# Patient Record
Sex: FEMALE | Race: WHITE | NOT HISPANIC OR LATINO | Employment: FULL TIME | URBAN - METROPOLITAN AREA
[De-identification: names, ages, dates, MRNs, and addresses within clinical notes are randomized per-mention and may not be internally consistent; named-entity substitution may affect disease eponyms.]

---

## 2024-10-28 ENCOUNTER — OFFICE VISIT (OUTPATIENT)
Age: 44
End: 2024-10-28
Payer: MEDICAID

## 2024-10-28 ENCOUNTER — LAB (OUTPATIENT)
Facility: HOSPITAL | Age: 44
End: 2024-10-28
Payer: MEDICAID

## 2024-10-28 ENCOUNTER — TELEPHONE (OUTPATIENT)
Age: 44
End: 2024-10-28

## 2024-10-28 VITALS
TEMPERATURE: 98.2 F | HEIGHT: 66 IN | SYSTOLIC BLOOD PRESSURE: 118 MMHG | HEART RATE: 81 BPM | WEIGHT: 162.9 LBS | DIASTOLIC BLOOD PRESSURE: 80 MMHG | BODY MASS INDEX: 26.18 KG/M2

## 2024-10-28 DIAGNOSIS — R53.83 OTHER FATIGUE: ICD-10-CM

## 2024-10-28 DIAGNOSIS — Z79.899 IMMUNOSUPPRESSION DUE TO DRUG THERAPY: ICD-10-CM

## 2024-10-28 DIAGNOSIS — M32.9 SYSTEMIC LUPUS ERYTHEMATOSUS, UNSPECIFIED SLE TYPE, UNSPECIFIED ORGAN INVOLVEMENT STATUS: ICD-10-CM

## 2024-10-28 DIAGNOSIS — M51.369 DEGENERATION OF INTERVERTEBRAL DISC OF LUMBAR REGION, UNSPECIFIED WHETHER PAIN PRESENT: ICD-10-CM

## 2024-10-28 DIAGNOSIS — M32.9 SYSTEMIC LUPUS ERYTHEMATOSUS, UNSPECIFIED SLE TYPE, UNSPECIFIED ORGAN INVOLVEMENT STATUS: Primary | ICD-10-CM

## 2024-10-28 DIAGNOSIS — D84.821 IMMUNOSUPPRESSION DUE TO DRUG THERAPY: ICD-10-CM

## 2024-10-28 DIAGNOSIS — Z79.899 HIGH RISK MEDICATION USE: ICD-10-CM

## 2024-10-28 DIAGNOSIS — M25.50 ARTHRALGIA OF MULTIPLE SITES: ICD-10-CM

## 2024-10-28 DIAGNOSIS — F32.A ANXIETY AND DEPRESSION: ICD-10-CM

## 2024-10-28 DIAGNOSIS — R76.8 ANA POSITIVE: ICD-10-CM

## 2024-10-28 DIAGNOSIS — M79.7 FIBROMYALGIA: ICD-10-CM

## 2024-10-28 DIAGNOSIS — F41.9 ANXIETY AND DEPRESSION: ICD-10-CM

## 2024-10-28 LAB
AMPHET+METHAMPHET UR QL: NEGATIVE
AMPHETAMINES UR QL: NEGATIVE
BACTERIA UR QL AUTO: ABNORMAL /HPF
BARBITURATES UR QL SCN: NEGATIVE
BASOPHILS # BLD AUTO: 0.04 10*3/MM3 (ref 0–0.2)
BASOPHILS NFR BLD AUTO: 0.8 % (ref 0–1.5)
BENZODIAZ UR QL SCN: NEGATIVE
BILIRUB UR QL STRIP: NEGATIVE
BUPRENORPHINE SERPL-MCNC: NEGATIVE NG/ML
CANNABINOIDS SERPL QL: POSITIVE
CLARITY UR: CLEAR
COCAINE UR QL: NEGATIVE
COLOR UR: YELLOW
DEPRECATED RDW RBC AUTO: 39.5 FL (ref 37–54)
EOSINOPHIL # BLD AUTO: 0.02 10*3/MM3 (ref 0–0.4)
EOSINOPHIL NFR BLD AUTO: 0.4 % (ref 0.3–6.2)
ERYTHROCYTE [DISTWIDTH] IN BLOOD BY AUTOMATED COUNT: 11.9 % (ref 12.3–15.4)
ERYTHROCYTE [SEDIMENTATION RATE] IN BLOOD: 1 MM/HR (ref 0–20)
FENTANYL UR-MCNC: NEGATIVE NG/ML
GLUCOSE UR STRIP-MCNC: NEGATIVE MG/DL
HCT VFR BLD AUTO: 36.7 % (ref 34–46.6)
HGB BLD-MCNC: 12.6 G/DL (ref 12–15.9)
HGB UR QL STRIP.AUTO: NEGATIVE
HOLD SPECIMEN: NORMAL
HYALINE CASTS UR QL AUTO: ABNORMAL /LPF
IMM GRANULOCYTES # BLD AUTO: 0.01 10*3/MM3 (ref 0–0.05)
IMM GRANULOCYTES NFR BLD AUTO: 0.2 % (ref 0–0.5)
KETONES UR QL STRIP: NEGATIVE
LEUKOCYTE ESTERASE UR QL STRIP.AUTO: ABNORMAL
LYMPHOCYTES # BLD AUTO: 1.47 10*3/MM3 (ref 0.7–3.1)
LYMPHOCYTES NFR BLD AUTO: 29.3 % (ref 19.6–45.3)
MCH RBC QN AUTO: 31.2 PG (ref 26.6–33)
MCHC RBC AUTO-ENTMCNC: 34.3 G/DL (ref 31.5–35.7)
MCV RBC AUTO: 90.8 FL (ref 79–97)
METHADONE UR QL SCN: NEGATIVE
MONOCYTES # BLD AUTO: 0.21 10*3/MM3 (ref 0.1–0.9)
MONOCYTES NFR BLD AUTO: 4.2 % (ref 5–12)
NEUTROPHILS NFR BLD AUTO: 3.27 10*3/MM3 (ref 1.7–7)
NEUTROPHILS NFR BLD AUTO: 65.1 % (ref 42.7–76)
NITRITE UR QL STRIP: NEGATIVE
NRBC BLD AUTO-RTO: 0 /100 WBC (ref 0–0.2)
OPIATES UR QL: NEGATIVE
OXYCODONE UR QL SCN: NEGATIVE
PCP UR QL SCN: NEGATIVE
PH UR STRIP.AUTO: 5.5 [PH] (ref 5–8)
PLATELET # BLD AUTO: 239 10*3/MM3 (ref 140–450)
PMV BLD AUTO: 10.1 FL (ref 6–12)
PROT UR QL STRIP: ABNORMAL
RBC # BLD AUTO: 4.04 10*6/MM3 (ref 3.77–5.28)
RBC # UR STRIP: ABNORMAL /HPF
REF LAB TEST METHOD: ABNORMAL
SP GR UR STRIP: 1.02 (ref 1–1.03)
SQUAMOUS #/AREA URNS HPF: ABNORMAL /HPF
TRICYCLICS UR QL SCN: NEGATIVE
UROBILINOGEN UR QL STRIP: ABNORMAL
WBC # UR STRIP: ABNORMAL /HPF
WBC NRBC COR # BLD AUTO: 5.02 10*3/MM3 (ref 3.4–10.8)

## 2024-10-28 PROCEDURE — 82784 ASSAY IGA/IGD/IGG/IGM EACH: CPT

## 2024-10-28 PROCEDURE — 81001 URINALYSIS AUTO W/SCOPE: CPT

## 2024-10-28 PROCEDURE — 36415 COLL VENOUS BLD VENIPUNCTURE: CPT

## 2024-10-28 PROCEDURE — 86431 RHEUMATOID FACTOR QUANT: CPT

## 2024-10-28 PROCEDURE — 83516 IMMUNOASSAY NONANTIBODY: CPT

## 2024-10-28 PROCEDURE — 85613 RUSSELL VIPER VENOM DILUTED: CPT

## 2024-10-28 PROCEDURE — 81374 HLA I TYPING 1 ANTIGEN LR: CPT

## 2024-10-28 PROCEDURE — 86364 TISS TRNSGLTMNASE EA IG CLAS: CPT

## 2024-10-28 PROCEDURE — 86140 C-REACTIVE PROTEIN: CPT

## 2024-10-28 PROCEDURE — 86037 ANCA TITER EACH ANTIBODY: CPT

## 2024-10-28 PROCEDURE — 80074 ACUTE HEPATITIS PANEL: CPT

## 2024-10-28 PROCEDURE — 1160F RVW MEDS BY RX/DR IN RCRD: CPT | Performed by: INTERNAL MEDICINE

## 2024-10-28 PROCEDURE — 86235 NUCLEAR ANTIGEN ANTIBODY: CPT

## 2024-10-28 PROCEDURE — 85652 RBC SED RATE AUTOMATED: CPT

## 2024-10-28 PROCEDURE — 85732 THROMBOPLASTIN TIME PARTIAL: CPT

## 2024-10-28 PROCEDURE — 86160 COMPLEMENT ANTIGEN: CPT

## 2024-10-28 PROCEDURE — 82550 ASSAY OF CK (CPK): CPT

## 2024-10-28 PROCEDURE — 86225 DNA ANTIBODY NATIVE: CPT

## 2024-10-28 PROCEDURE — 80053 COMPREHEN METABOLIC PANEL: CPT

## 2024-10-28 PROCEDURE — 99205 OFFICE O/P NEW HI 60 MIN: CPT | Performed by: INTERNAL MEDICINE

## 2024-10-28 PROCEDURE — 86480 TB TEST CELL IMMUN MEASURE: CPT

## 2024-10-28 PROCEDURE — 86038 ANTINUCLEAR ANTIBODIES: CPT

## 2024-10-28 PROCEDURE — 87086 URINE CULTURE/COLONY COUNT: CPT

## 2024-10-28 PROCEDURE — 86146 BETA-2 GLYCOPROTEIN ANTIBODY: CPT

## 2024-10-28 PROCEDURE — 85025 COMPLETE CBC W/AUTO DIFF WBC: CPT

## 2024-10-28 PROCEDURE — 86200 CCP ANTIBODY: CPT

## 2024-10-28 PROCEDURE — 86147 CARDIOLIPIN ANTIBODY EA IG: CPT

## 2024-10-28 PROCEDURE — 86258 DGP ANTIBODY EACH IG CLASS: CPT

## 2024-10-28 PROCEDURE — 80307 DRUG TEST PRSMV CHEM ANLYZR: CPT

## 2024-10-28 PROCEDURE — 1159F MED LIST DOCD IN RCRD: CPT | Performed by: INTERNAL MEDICINE

## 2024-10-28 RX ORDER — HYDROXYCHLOROQUINE SULFATE 200 MG/1
200 TABLET, FILM COATED ORAL DAILY
COMMUNITY
Start: 2017-10-28 | End: 2024-10-28 | Stop reason: SDUPTHER

## 2024-10-28 RX ORDER — HYDROXYCHLOROQUINE SULFATE 200 MG/1
200 TABLET, FILM COATED ORAL DAILY
Qty: 90 TABLET | Refills: 1 | Status: SHIPPED | OUTPATIENT
Start: 2024-10-28

## 2024-10-28 RX ORDER — DULOXETINE HYDROCHLORIDE 60 MG/1
60 CAPSULE, DELAYED RELEASE ORAL DAILY
COMMUNITY
Start: 2017-10-28

## 2024-10-28 RX ORDER — IRON,CARBONYL/ASCORBIC ACID 100-250 MG
1 TABLET ORAL DAILY
COMMUNITY

## 2024-10-28 RX ORDER — NORETHINDRONE ACETATE AND ETHINYL ESTRADIOL 1.5; 3 MG/1; UG/1
1 TABLET ORAL DAILY
COMMUNITY
Start: 2024-10-25

## 2024-10-28 RX ORDER — PREGABALIN 75 MG/1
75 CAPSULE ORAL 2 TIMES DAILY
Qty: 60 CAPSULE | Refills: 5 | Status: SHIPPED | OUTPATIENT
Start: 2024-10-28

## 2024-10-28 RX ORDER — CLONAZEPAM 0.5 MG/1
0.5 TABLET ORAL AS NEEDED
COMMUNITY

## 2024-10-28 RX ORDER — DULOXETINE HYDROCHLORIDE 30 MG/1
30 CAPSULE, DELAYED RELEASE ORAL DAILY
COMMUNITY

## 2024-10-28 NOTE — ASSESSMENT & PLAN NOTE
Active fibromyalgia suspect secondary to lupus  Add pregabalin  -Pain management agreement reviewed and signed in 2024  -Anatoly reviewed and appropriate  -Urine drug screen obtained 10/28/2024  -Discussed risk of pregabalin including dizziness sedation falls allergic reaction with withdrawal    The symptoms are predominantly neuropathic. Patient description of joints are most consistent with neuropathic symptoms/dysesthesias seen in a variety of neuropathic states including Fibromyalgia. Neuropathic medications form the basis of treatment for these conditions. Narcotics have no role in the treatment of neuropathic pain. I explained to her that neuropathic syndromes are generally long term syndromes that don't go away.     I encouraged regular low-impact exercise up to 30 minutes/day.  If this is unattainable, recommended graded exercise program starting with 5 minutes of dedicated cardiovascular exercise daily, increasing by 1 minute daily until goal of 30 minutes daily is reached.    -Recommend conservative measures to improve sleep  -Consider referral to sleep medicine for PAPI screening-   -Counseled on alternative therapies that can help with chronic pain including massage therapy, yoga, jimmy chi

## 2024-10-28 NOTE — ASSESSMENT & PLAN NOTE
, single with 3 children  Lab 7/8/24: Normal CBC, normal sed rate/CRP, negative rheumatoid factor, normal TSH, normal CMP  Reportedly RONNIE +2017 with Dr. Rivas who initially diagnosed her with lupus 2017  Subsequently saw Dr. Sotelo and Dr. Genao who no longer take her insurance  Symptoms: Malar rash, arthralgias/myalgias, fatigue, mouth sores  *Treatment: Hydroxychloroquine 2017(did not tolerate twice daily); Benlysta IV PA 10/28/2024      High disease activity from suspected systemic lupus based on positive RONNIE, arthritis, malar rash, oral ulcers  -Request records/labs from Dr. Jose Rivas, Dr. Genao/Dr Sotelo [facility] [place] [facility]  -Labs ordered today as below for continued investigation  -Continue hydroxychloroquine 200 mg once daily.  -Discussed that her disease is not under control and recommend addition of either azathioprine, Benlysta or Saphnelo  After discussing available options, she seems most interested in IV Benlysta.  Will prior authorize IV Benlysta for infusions at [facility]  -Obtain cervical sacroiliac and chest x-ray today.  Screen for TB and hepatitis.  Check HLA-B27  -Handouts provided on lupus, Plaquenil, Benlysta  -Suspect she has secondary fibromyalgia seen in 25% of patients with lupus.  -Add Lyrica for fibromyalgia pain as discussed below  Return to clinic 4 months

## 2024-10-28 NOTE — TELEPHONE ENCOUNTER
Prior auth IV benlysta for SLE;  failed plaquenil.  She would like to infuse [place] which would be closest to her

## 2024-10-28 NOTE — PROGRESS NOTES
Office Visit       Date: 10/28/2024   Patient Name: Юлия Bautista  MRN: [id number]  YOB: 1980    Referring Physician: Rodrick Main MD     Chief Complaint:   Chief Complaint   Patient presents with    Lupus       History of Present Illness: Юлия Bautista is a 44 y.o. female with history of degenerative disc disease lumbar spine anxiety/depression, migraine headaches, GERD who is here today consultation from her PCP for SLE    She reports initially being diagnosed with lupus by Dr. Rivas in [place] around 2017.  At that time she had swollen knee, swollen and painful elbow, malar rash and labs showing positive RONNIE.  Dr. Rivas started her on Plaquenil.  She did increase Plaquenil from once daily to twice daily but it seemed to upset her stomach so she went back to once daily.  She is unsure if the Plaquenil has helped her.  She subsequently saw rheumatology Dr. Sotelo/Dr. Genao in [place], but they no longer take her insurance.  She is here today to establish with a new rheumatologist.    She reports recently she has been feeling awful.  She aches all over her body and her joints and muscles.  No swollen joints.  She gets frequent flareups of malar rash and severe fatigue.  States she has pain all over her body.  Her low back and neck hurt.  The pain is so terrible it worsens her anxiety and she is in tears at the end of the day.  She has been taking lots of over-the-counter ibuprofen for the pain but it is not adequate.  The pain and fatigue is often debilitating for her.  She does get frequent mouth sores.  She has geographic tongue    Denies Raynaud's, pleurisy/pericarditis, renal/hematologic abnormality, clotting disorder, seizure disorder, iritis, psoriasis.  She has no endorgan involvement from lupus.      Lab 7/8/24: Normal CBC, normal sed rate/CRP, negative rheumatoid factor, normal TSH, normal CMP      Subjective     Review of Systems: Review of Systems    Constitutional:  Positive for chills, fatigue, unexpected weight gain and unexpected weight loss. Negative for fever.   HENT:  Positive for tinnitus and trouble swallowing. Negative for mouth sores, sinus pressure and sore throat.    Eyes:  Negative for pain and redness.   Respiratory:  Negative for cough and shortness of breath.    Cardiovascular:  Positive for palpitations. Negative for chest pain.   Gastrointestinal:  Positive for constipation, diarrhea and nausea. Negative for abdominal pain, blood in stool, vomiting and GERD.   Endocrine: Positive for cold intolerance. Negative for polydipsia and polyuria.   Genitourinary:  Negative for dysuria, genital sores and hematuria.   Musculoskeletal:  Positive for arthralgias, back pain, myalgias, neck pain and neck stiffness. Negative for joint swelling.   Skin:  Positive for bruise. Negative for rash.   Allergic/Immunologic: Negative for immunocompromised state.   Neurological:  Positive for weakness, headache and memory problem. Negative for seizures and numbness.   Hematological:  Negative for adenopathy. Bruises/bleeds easily.   Psychiatric/Behavioral:  Positive for depressed mood. The patient is nervous/anxious.         Past Medical History:   Past Medical History:   Diagnosis Date    Anemia     GERD (gastroesophageal reflux disease)     Hypertension     Lupus (systemic lupus erythematosus)     Migraine headache        Past Surgical History:   Past Surgical History:   Procedure Laterality Date    LAPAROSCOPIC TUBAL LIGATION      WISDOM TOOTH EXTRACTION         Family History:   Family History   Problem Relation Age of Onset    Depression Mother     Arthritis Mother         osteoarthritis    COPD Mother     Emphysema Mother     Anxiety disorder Mother     Fibromyalgia Mother     Arthritis Father         osteoarthritis    Emphysema Father     COPD Father     Depression Daughter     Anxiety disorder Daughter     Other (POTS) Son     Depression Son     Anxiety  "disorder Son     Autism Son        Social History:   Social History     Socioeconomic History    Marital status:    Tobacco Use    Smoking status: Never    Smokeless tobacco: Never   Vaping Use    Vaping status: Never Used   Substance and Sexual Activity    Alcohol use: Yes     Comment: rarely, never heavy    Drug use: Never    Sexual activity: Defer       Medications:   Current Outpatient Medications:     clonazePAM (KlonoPIN) 0.5 MG tablet, Take 1 tablet by mouth As Needed., Disp: , Rfl:     Cymbalta 30 MG capsule, Take 1 capsule by mouth Daily., Disp: , Rfl:     Cymbalta 60 MG capsule, Take 1 capsule by mouth Daily., Disp: , Rfl:     hydroxychloroquine (PLAQUENIL) 200 MG tablet, Take 1 tablet by mouth Daily., Disp: 90 tablet, Rfl: 1    Iron-Vitamin C (Iron 100/C) 100-250 MG tablet, Take 1 tablet by mouth Daily., Disp: , Rfl:     Junel 1.5/30 1.5-30 MG-MCG tablet, Take 1 tablet by mouth Daily., Disp: , Rfl:     pregabalin (Lyrica) 75 MG capsule, Take 1 capsule by mouth 2 (Two) Times a Day., Disp: 60 capsule, Rfl: 5    Allergies:   Allergies   Allergen Reactions    Chloraseptic [Benzocaine-Menthol] Anaphylaxis     Chloraseptic throat spray       I have reviewed and updated the patient's chief complaint, history of present illness, review of systems, past medical history, surgical history, family history, social history, medications and allergy list as appropriate.     Objective      Vital Signs:   Vitals:    10/28/24 1122   BP: 118/80   BP Location: Left arm   Patient Position: Sitting   Cuff Size: Adult   Pulse: 81   Temp: 98.2 °F (36.8 °C)   Weight: 73.9 kg (162 lb 14.4 oz)   Height: 167.6 cm (66\")   PainSc:   4     Body mass index is 26.29 kg/m².       Physical Exam:  Physical Exam   MUSCULOSKELETAL:   No peripheral synovitis.  No joint swelling or deformity.  Widespread tender points are present above below the waist.  Tender cervical and lumbar spine    Complete joint exam was performed including the " "MCPs, PIPs, DIPs of the hands, wrists, elbows, shoulders, hips, knees and ankles.  No soft tissue swelling or tenderness is present except as above.    General: The patient is well-developed and well nourished. Cooperative, alert and oriented. Affect is normal. Hydration appears normal.   HEENT: Normocephalic and atraumatic. No notable alopecia. Lids and conjunctiva are normal. Pupils are equal and sclera are clear. Oropharynx is clear   NECK neck is supple without adenopathy, masses or thyromegaly.   CARDIOVASCULAR: Regular rate and rhythm. No murmurs, rubs or gallops   LUNGS: Effort is normal. Lungs are clear bilateral   ABDOMEN: Not examined  EXTREMITIES: Peripheral pulses are intact. No clubbing.   SKIN: No rashes. No subcutaneous nodules. No digital ulcers. No sclerodactyly.   NEUROLOGIC: Gait is normal. Strength testing is normal.  No focal neurologic deficits    Results Review:   Labs:    Lab Results   Component Value Date    CREATININE 0.4 (L) 09/24/2015    BILITOT 0.4 09/24/2015    AST 18 09/24/2015    ALT 27 09/24/2015     Lab Results   Component Value Date    WBC 8.32 09/24/2015    HGB 11.7 09/24/2015    HCT 35.5 09/24/2015    MCV 88.1 09/24/2015     09/24/2015     No results found for: \"SEDRATE\"  No results found for: \"CRP\"  No results found for: \"QUANTIFERO\", \"QUANTITB1\", \"QUANTITB2\", \"QUANTIFERN\", \"QUANTIFERM\", \"QUANTITBGLDP\"  No results found for: \"RF\"  No results found for: \"HEPBSAG\", \"HEPAIGM\", \"HEPBIGMCORE\", \"HEPCVIRUSABY\"        Procedures    Assessment / Plan        Assessment & Plan  Systemic lupus erythematosus, unspecified SLE type, unspecified organ involvement status  , single with 3 children  Lab 7/8/24: Normal CBC, normal sed rate/CRP, negative rheumatoid factor, normal TSH, normal CMP  Reportedly RONNIE +2017 with Dr. Rivas who initially diagnosed her with lupus 2017  Subsequently saw Dr. Sotelo and Dr. Genao who no longer take her insurance  Symptoms: Malar " rash, arthralgias/myalgias, fatigue, mouth sores  *Treatment: Hydroxychloroquine 2017(did not tolerate twice daily); Benlysta IV PA 10/28/2024      High disease activity from suspected systemic lupus based on positive RONNIE, arthritis, malar rash, oral ulcers  -Request records/labs from Dr. Jose Rivas, Dr. Genao/Dr Sotelo [facility]  -Labs ordered today as below for continued investigation  -Continue hydroxychloroquine 200 mg once daily.  -Discussed that her disease is not under control and recommend addition of either azathioprine, Benlysta or Saphnelo  After discussing available options, she seems most interested in IV Benlysta.  Will prior authorize IV Benlysta for infusions at [facility]  -Obtain cervical sacroiliac and chest x-ray today.  Screen for TB and hepatitis.  Check HLA-B27  -Handouts provided on lupus, Plaquenil, Benlysta  -Suspect she has secondary fibromyalgia seen in 25% of patients with lupus.  -Add Lyrica for fibromyalgia pain as discussed below  Return to clinic 4 months    RONNIE positive  -Suspect secondary to lupus.  See above discussion  High risk medication use  Hydroxychloroquine, Benlysta  Hepatitis panel and QuantiFERON 10/28/2024    We discussed possible side effects of hydroxychloroquine including headache, nausea, diarrhea, rare retinal pigmentation, rare neuromuscular toxicity.  Recommend eye exams every 6 to 12 months to monitor for retinal toxicity.    We discussed biologic agents at length. Risks and alternative were discussed at length and the option of no treatment was also given. We discussed risks including but not limited to infections which can be unusual,  severe, and deadly. When possible, these agents should be stopped immediately if infections occur. Unusual infection such as TB and fungal infections can occur. There may be an increased risk of lymphoma with these agents. Other risks can include are multiple sclerosis like illness and worsening of  the failure. Infusion or injection reactions whish can be delay have been reported.  Reactivation of daily brain viruses have been reported.  Worsening of COPD has been seen with Orencia.  Elevated lipids, elevations in liver functions, and dangerous changes in blood counts have been seen with certain agents.  Regular monitoring will be required.  Immunosuppression due to drug therapy    Fibromyalgia  Active fibromyalgia suspect secondary to lupus  Add pregabalin  -Pain management agreement reviewed and signed in 2024  -Anatoly reviewed and appropriate  -Urine drug screen obtained 10/28/2024  -Discussed risk of pregabalin including dizziness sedation falls allergic reaction with withdrawal    The symptoms are predominantly neuropathic. Patient description of joints are most consistent with neuropathic symptoms/dysesthesias seen in a variety of neuropathic states including Fibromyalgia. Neuropathic medications form the basis of treatment for these conditions. Narcotics have no role in the treatment of neuropathic pain. I explained to her that neuropathic syndromes are generally long term syndromes that don't go away.     I encouraged regular low-impact exercise up to 30 minutes/day.  If this is unattainable, recommended graded exercise program starting with 5 minutes of dedicated cardiovascular exercise daily, increasing by 1 minute daily until goal of 30 minutes daily is reached.    -Recommend conservative measures to improve sleep  -Consider referral to sleep medicine for PAPI screening-   -Counseled on alternative therapies that can help with chronic pain including massage therapy, yoga, jimmy chi  Degeneration of intervertebral disc of lumbar region, unspecified whether pain present  -Continue as needed NSAID  Arthralgia of multiple sites    Anxiety and depression    Other fatigue      TIME SPENT: I spent 60 minutes caring for the patient on this date of service.  This time includes time spent by me in the following  activities: Preparing for the visit, obtaining records, reviewing/ordering tests and independently reviewing results, performing a medically appropriate history/exam, counseling and educating the patient/family/caregiver, ordering medications, tests, or procedures, and documenting information in the medical record.    Orders Placed This Encounter   Procedures    XR Chest 2 View    XR Sacroiliac Joints 3+ View    XR Spine Cervical 2 View    CBC Auto Differential    Comprehensive Metabolic Panel    C-reactive Protein    Sedimentation Rate    Urinalysis With Culture If Indicated -    Rheumatoid Factor    Cyclic Citrul Peptide Antibody, IgG / IgA    Hepatitis Panel, Acute    RONNIE by IFA, Reflex 9-biomarkers profile    ANCA Panel    C4+C3    Anticardiolipin Antibody, IgG / M, Qn    Beta-2 Glycoprotein Antibodies    Lupus Anticoagulant Reflex    HLA-B27 Antigen    Celiac Panel Reflex To Titer    QuantiFERON-TB Gold Plus    CK    Urine Drug Screen - Urine, Clean Catch     New Medications Ordered This Visit   Medications    hydroxychloroquine (PLAQUENIL) 200 MG tablet     Sig: Take 1 tablet by mouth Daily.     Dispense:  90 tablet     Refill:  1    pregabalin (Lyrica) 75 MG capsule     Sig: Take 1 capsule by mouth 2 (Two) Times a Day.     Dispense:  60 capsule     Refill:  5           Follow Up:   Return in about 4 months (around 2/28/2025).        Josias Vargas MD  [facility] [place]

## 2024-10-28 NOTE — PATIENT INSTRUCTIONS
Systemic Lupus Erythematosus, Adult    Systemic lupus erythematosus (SLE) is a long-term (chronic) disease that can affect many parts of the body. SLE is an autoimmune disease. With this type of disease, the body's defense system (immune system) mistakenly attacks healthy tissues. This can cause damage to the skin, joints, blood vessels, brain, kidneys, lungs, heart, and other internal organs. It causes pain, irritation, and inflammation.    What are the causes?  The cause of this condition is not known.    What increases the risk?    The following factors may make you more likely to develop this condition:  Being female.  Being of , , or  descent.  Having a family history of the condition.  Being exposed to tobacco smoke or smoking cigarettes.  Having an infection with a virus, such as Reagan-Barr virus.  Having a history of exposure to silica dust, metals, chemicals, mold or mildew, or insecticides.  Using oral contraceptives or hormone replacement therapy.    What are the signs or symptoms?    This condition can affect almost any organ or system in the body. Symptoms of the condition depend on which organ or system is affected.    The most common symptoms include:  Fever.  Tiredness (fatigue).  Weight loss.  Muscle aches.  Joint pain.  Skin rashes, especially over the nose and cheeks (butterfly rash) and after sun exposure.    Symptoms can come and go. A period of time when symptoms get worse or come back is called a flare. A period of time with no symptoms is called a remission.    How is this diagnosed?    This condition is diagnosed based on:  Your symptoms.  Your medical history.  A physical exam.    You may also have tests, including:  Blood tests.  Urine tests.  A chest X-ray.    You may be referred to an autoimmune disease specialist (rheumatologist).    How is this treated?    There is no cure for this condition, but treatment can help to control symptoms, prevent flares  (keep symptoms in remission), and prevent damage to the heart, lungs, kidneys, and other organs.     Treatment will depend on what symptoms you are having and what organs or systems are affected. Treatment may involve taking a combination of medicines over time.    Common medicines used to treat this condition include:  Antimalarial medicines to control symptoms, prevent flares, and protect against organ damage.  Corticosteroids and NSAIDs to reduce inflammation.  Medicines to weaken your immune system (immunosuppressants).  Biologic response modifiers to reduce inflammation and damage.    Follow these instructions at home:    Medicines  Take over-the-counter and prescription medicines only as told by your health care provider.  Do not take any medicines that contain estrogen without first checking with your health care provider. Estrogen can trigger flares and may increase your risk for blood clots.    Eating and drinking  Eat a heart-healthy diet. This may include:  Eating high-fiber foods, such as beans, whole grains, and fresh fruits and vegetables.  Eating heart-healthy fats (omega-3 fats), such as fish, flaxseed, and flaxseed oil.  Limiting foods that are high in saturated fat and cholesterol, such as processed and fried foods, fatty meat, and full-fat dairy.  Limiting how much salt (sodium) you eat.  Include calcium and vitamin D in your diet. Good sources of calcium and vitamin D include:  Low-fat dairy products such as milk, yogurt, and cheese.  Certain fish, such as fresh or canned salmon, tuna, and sardines.  Products that have calcium and vitamin D added to them (are fortified), such as fortified cereals or juice.    Lifestyle         Stay active, as directed by your health care provider.  Do not use any products that contain nicotine or tobacco. These products include cigarettes, chewing tobacco, and vaping devices, such as e-cigarettes. If you need help quitting, ask your health care provider.  Protect  your skin from the sun by applying sunblock and wearing protective hats and clothing.  Learn as much as you can about your condition and have a good support system in place. Support may come from family, friends, or a lupus support group.    General instructions  Work closely with all of your health care providers to manage your condition.  Stay up to date on all vaccines as told by your health care provider.  Keep all follow-up visits. This is important.    Contact a health care provider if:  You have a fever.  Your symptoms flare.  You develop new symptoms.  You have an upper respiratory infection.  You have bloody, foamy, or coffee-colored urine.  There are changes in your urination. For example, you urinate more often at night.  You think that you may be depressed or have anxiety.  You become pregnant or plan to become pregnant. Pregnancy in women with this condition is considered high risk.    Get help right away if:  You have chest pain.  You have trouble breathing.  You have a seizure.  You suddenly get a very bad headache.  You suddenly develop facial or body weakness.  You cannot speak.  You cannot understand speech.  These symptoms may be an emergency. Get help right away. Call 911.  Do not wait to see if the symptoms will go away.  Do not drive yourself to the hospital.    Summary  Systemic lupus erythematosus (SLE) is a long-term disease that can affect many parts of the body.  SLE is an autoimmune disease. That means your body's defense system (immune system) mistakenly attacks healthy tissues.  There is no cure for this condition, but treatment can help to control symptoms, prevent flares, and prevent damage to your organs. Treatment may involve taking a combination of medicines over time.    This information is not intended to replace advice given to you by your health care provider. Make sure you discuss any questions you have with your health care provider.    Document Revised: 09/23/2022 Document  Reviewed: 09/23/2022  [employer] Patient Education © 2024 [employer] Hydroxychloroquine Tablets    What is this medication?  HYDROXYCHLOROQUINE (esau drox ee KLOR oh kwin) treats autoimmune conditions, such as rheumatoid arthritis and lupus. It works by slowing down an overactive immune system. It may also be used to prevent and treat malaria. It works by killing the parasite that causes malaria. It belongs to a group of medications called DMARDs.  This medicine may be used for other purposes; ask your health care provider or pharmacist if you have questions.  COMMON BRAND NAME(S): Plaquenil, Quineprox    What should I tell my care team before I take this medication?  They need to know if you have any of these conditions:  Diabetes  Eye disease, vision problems  Frequently drink alcohol  G6PD deficiency  Heart disease  Irregular heartbeat or rhythm  Kidney disease  Liver disease  Porphyria  Psoriasis  An unusual or allergic reaction to hydroxychloroquine, other medications, foods, dyes, or preservatives  Pregnant or trying to get pregnant  Breastfeeding    How should I use this medication?  Take this medication by mouth with water. Take it as directed on the prescription label. Do not cut, crush, or chew this medication. Swallow the tablets whole. Take it with food. Do not take it more than directed. Take all of this medication unless your care team tells you to stop it early. Keep taking it even if you think you are better.  Take products with antacids in them at a different time of day than this medication. Take this medication 4 hours before or 4 hours after antacids. Talk to your care team if you have questions.  Talk to your care team about the use of this medication in children. While this medication may be prescribed for selected conditions, precautions do apply.  Overdosage: If you think you have taken too much of this medicine contact a poison control center or emergency room at once.  NOTE: This medicine  is only for you. Do not share this medicine with others.    What if I miss a dose?  If you miss a dose, take it as soon as you can. If it is almost time for your next dose, take only that dose. Do not take double or extra doses.    What may interact with this medication?  Do not take this medication with any of the following:  Cisapride  Dronedarone  Pimozide  Thioridazine  This medication may also interact with the following:  Ampicillin  Antacids  Cimetidine  Cyclosporine  Digoxin  Kaolin  Medications for diabetes, such as insulin, glipizide, glyburide  Medications for seizures, such as carbamazepine, phenobarbital, phenytoin  Mefloquine  Methotrexate  Other medications that cause heart rhythm changes  Praziquantel  This list may not describe all possible interactions. Give your health care provider a list of all the medicines, herbs, non-prescription drugs, or dietary supplements you use. Also tell them if you smoke, drink alcohol, or use illegal drugs. Some items may interact with your medicine.    What should I watch for while using this medication?  Visit your care team for regular checks on your progress. Tell your care team if your symptoms do not start to get better or if they get worse.  You may need blood work done while you are taking this medication. If you take other medications that can affect heart rhythm, you may need more testing. Talk to your care team if you have questions.  Your vision may be tested before and during use of this medication. Tell your care team right away if you have any change in your eyesight.  This medication may cause serious skin reactions. They can happen weeks to months after starting the medication. Contact your care team right away if you notice fevers or flu-like symptoms with a rash. The rash may be red or purple and then turn into blisters or peeling of the skin. Or, you might notice a red rash with swelling of the face, lips or lymph nodes in your neck or under your  arms.  If you or your family notice any changes in your behavior, such as new or worsening depression, thoughts of harming yourself, anxiety, or other unusual or disturbing thoughts, or memory loss, call your care team right away.    What side effects may I notice from receiving this medication?  Side effects that you should report to your care team as soon as possible:  Allergic reactions--skin rash, itching, hives, swelling of the face, lips, tongue, or throat  Aplastic anemia--unusual weakness or fatigue, dizziness, headache, trouble breathing, increased bleeding or bruising  Change in vision  Heart rhythm changes--fast or irregular heartbeat, dizziness, feeling faint or lightheaded, chest pain, trouble breathing  Infection--fever, chills, cough, or sore throat  Low blood sugar (hypoglycemia)--tremors or shaking, anxiety, sweating, cold or clammy skin, confusion, dizziness, rapid heartbeat  Muscle injury--unusual weakness or fatigue, muscle pain, dark yellow or brown urine, decrease in amount of urine  Pain, tingling, or numbness in the hands or feet  Rash, fever, and swollen lymph nodes  Redness, blistering, peeling, or loosening of the skin, including inside the mouth  Thoughts of suicide or self-harm, worsening mood, or feelings of depression  Unusual bruising or bleeding  Side effects that usually do not require medical attention (report to your care team if they continue or are bothersome):  Diarrhea  Headache  Nausea  Stomach pain  Vomiting  This list may not describe all possible side effects. Call your doctor for medical advice about side effects. You may report side effects to FDA at 1-610-OAQ-0291.    Where should I keep my medication?  Keep out of the reach of children and pets.  Store at room temperature up to 30 degrees C (86 degrees F). Protect from light. Get rid of any unused medication after the expiration date.  To get rid of medications that are no longer needed or have :  Take the  medication to a medication take-back program. Check with your pharmacy or law enforcement to find a location.  If you cannot return the medication, check the label or package insert to see if the medication should be thrown out in the garbage or flushed down the toilet. If you are not sure, ask your care team. If it is safe to put it in the trash, empty the medication out of the container. Mix the medication with cat litter, dirt, coffee grounds, or other unwanted substance. Seal the mixture in a bag or container. Put it in the trash.  NOTE: This sheet is a summary. It may not cover all possible information. If you have questions about this medicine, talk to your doctor, pharmacist, or health care provider.  © 2024 [employer]vier/Gold Standard (2023-06-26 00:00:00) Myofascial Pain Syndrome and Fibromyalgia    Myofascial pain syndrome and fibromyalgia are both pain disorders. You may feel this pain mainly in your muscles.  Myofascial pain syndrome:  Always has tender points in the muscles that will cause pain when pressed (trigger points). The pain may come and go.  Usually affects your neck, upper back, and shoulder areas. The pain often moves into your arms and hands.  Fibromyalgia:  Has muscle pains and tenderness that come and go.  Is often associated with tiredness (fatigue) and sleep problems.  Has trigger points.  Tends to be long-lasting (chronic), but is not life-threatening.  Fibromyalgia and myofascial pain syndrome are not the same. However, they often occur together. If you have both conditions, each can make the other worse. Both are common and can cause enough pain and fatigue to make day-to-day activities difficult. Both can be hard to diagnose because their symptoms are common in many other conditions.    What are the causes?  The exact causes of these conditions are not known.    What increases the risk?  You are more likely to develop either of these conditions if:  You have a family history of the  condition.  You are female.  You have certain triggers, such as:  Spine disorders.  An injury (trauma) or other physical stressors.  Being under a lot of stress.  Medical conditions such as osteoarthritis, rheumatoid arthritis, or lupus.    What are the signs or symptoms?    Fibromyalgia  The main symptom of fibromyalgia is widespread pain and tenderness in your muscles. Pain is sometimes described as stabbing, shooting, or burning.  You may also have:  Tingling or numbness.  Sleep problems and fatigue.  Problems with attention and concentration (fibro fog).  Other symptoms may include:  Bowel and bladder problems.  Headaches.  Vision problems.  Sensitivity to odors and noises.  Depression or mood changes.  Painful menstrual periods (dysmenorrhea).  Dry skin or eyes.  These symptoms can vary over time.    Myofascial pain syndrome  Symptoms of myofascial pain syndrome include:  Tight, ropy bands of muscle.  Uncomfortable sensations in muscle areas. These may include aching, cramping, burning, numbness, tingling, and weakness.  Difficulty moving certain parts of the body freely (poor range of motion).    How is this diagnosed?  This condition may be diagnosed by your symptoms and medical history. You will also have a physical exam. In general:  Fibromyalgia is diagnosed if you have pain, fatigue, and other symptoms for more than 3 months, and symptoms cannot be explained by another condition.  Myofascial pain syndrome is diagnosed if you have trigger points in your muscles, and those trigger points are tender and cause pain elsewhere in your body (referred pain).    How is this treated?    Treatment for these conditions depends on the type that you have.  For fibromyalgia, a healthy lifestyle is the most important treatment including aerobic and strength exercises. Different types of medicines are used to help treat pain and include:  NSAIDs.  Medicines for treating depression.  Medicines that help control  seizures.  Medicines that relax the muscles.  Treatment for myofascial pain syndrome includes:  Pain medicines, such as NSAIDs.  Cooling and stretching of muscles.  Massage therapy with myofascial release technique.  Trigger point injections.    Treating these conditions often requires a team of health care providers. These may include:  Your primary care provider.  A physical therapist.  Complementary health care providers, such as massage therapists or acupuncturists.  A psychiatrist for cognitive behavioral therapy.    Follow these instructions at home:  Medicines  Take over-the-counter and prescription medicines only as told by your health care provider.  Ask your health care provider if the medicine prescribed to you:  Requires you to avoid driving or using machinery.  Can cause constipation. You may need to take these actions to prevent or treat constipation:  Drink enough fluid to keep your urine pale yellow.  Take over-the-counter or prescription medicines.  Eat foods that are high in fiber, such as beans, whole grains, and fresh fruits and vegetables.  Limit foods that are high in fat and processed sugars, such as fried or sweet foods.    Lifestyle    Do exercises as told by your health care provider or physical therapist.  Practice relaxation techniques to control your stress. You may want to try:  Biofeedback.  Visual imagery.  Hypnosis.  Muscle relaxation.  Yoga.  Meditation.  Maintain a healthy lifestyle. This includes eating a healthy diet and getting enough sleep.  Do not use any products that contain nicotine or tobacco. These products include cigarettes, chewing tobacco, and vaping devices, such as e-cigarettes. If you need help quitting, ask your health care provider.    General instructions  Talk to your health care provider about complementary treatments, such as acupuncture or massage.  Do not do activities that stress or strain your muscles. This includes repetitive motions and heavy  lifting.  Keep all follow-up visits. This is important.    Where to find support  Consider joining a support group with others who are diagnosed with this condition.  National Fibromyalgia Association: fmaware.org    Where to find more information  [place] [employer] Foundation: uspainfoundation.org    Contact a health care provider if:  You have new symptoms.  Your symptoms get worse or your pain is severe.  You have side effects from your medicines.  You have trouble sleeping.  Your condition is causing depression or anxiety.    Get help right away if:  You have thoughts of hurting yourself or others.    Get help right away if you feel like you may hurt yourself or others, or have thoughts about taking your own life. Go to your nearest emergency room or:  Call 911.  Call the National Suicide Prevention Lifeline at 1-706.680.8318 or 001. This is open 24 hours a day.  Text the Crisis Text Line at 626964.  This information is not intended to replace advice given to you by your health care provider. Make sure you discuss any questions you have with your health care provider.  Document Revised: 09/25/2023 Document Reviewed: 11/18/2022  Elsevier Patient Education © 2024 [employer].

## 2024-10-28 NOTE — ASSESSMENT & PLAN NOTE
Hydroxychloroquine, Benlysta  Hepatitis panel and QuantiFERON 10/28/2024    We discussed possible side effects of hydroxychloroquine including headache, nausea, diarrhea, rare retinal pigmentation, rare neuromuscular toxicity.  Recommend eye exams every 6 to 12 months to monitor for retinal toxicity.    We discussed biologic agents at length. Risks and alternative were discussed at length and the option of no treatment was also given. We discussed risks including but not limited to infections which can be unusual,  severe, and deadly. When possible, these agents should be stopped immediately if infections occur. Unusual infection such as TB and fungal infections can occur. There may be an increased risk of lymphoma with these agents. Other risks can include are multiple sclerosis like illness and worsening of the failure. Infusion or injection reactions whish can be delay have been reported.  Reactivation of daily brain viruses have been reported.  Worsening of COPD has been seen with Orencia.  Elevated lipids, elevations in liver functions, and dangerous changes in blood counts have been seen with certain agents.  Regular monitoring will be required.

## 2024-10-29 DIAGNOSIS — M32.9 SYSTEMIC LUPUS ERYTHEMATOSUS, UNSPECIFIED SLE TYPE, UNSPECIFIED ORGAN INVOLVEMENT STATUS: Primary | ICD-10-CM

## 2024-10-29 LAB
ALBUMIN SERPL-MCNC: 4.1 G/DL (ref 3.5–5.2)
ALBUMIN/GLOB SERPL: 1.7 G/DL
ALP SERPL-CCNC: 39 U/L (ref 39–117)
ALT SERPL W P-5'-P-CCNC: 11 U/L (ref 1–33)
ANION GAP SERPL CALCULATED.3IONS-SCNC: 7.3 MMOL/L (ref 5–15)
AST SERPL-CCNC: 17 U/L (ref 1–32)
BILIRUB SERPL-MCNC: 0.4 MG/DL (ref 0–1.2)
BUN SERPL-MCNC: 11 MG/DL (ref 6–20)
BUN/CREAT SERPL: 16.2 (ref 7–25)
C3 SERPL-MCNC: 103 MG/DL (ref 82–167)
C4 SERPL-MCNC: 17 MG/DL (ref 14–44)
CALCIUM SPEC-SCNC: 8.4 MG/DL (ref 8.6–10.5)
CHLORIDE SERPL-SCNC: 106 MMOL/L (ref 98–107)
CHROMATIN AB SERPL-ACNC: <10 IU/ML (ref 0–14)
CK SERPL-CCNC: 74 U/L (ref 20–180)
CO2 SERPL-SCNC: 22.7 MMOL/L (ref 22–29)
CREAT SERPL-MCNC: 0.68 MG/DL (ref 0.57–1)
CRP SERPL-MCNC: <0.3 MG/DL (ref 0–0.5)
EGFRCR SERPLBLD CKD-EPI 2021: 111.7 ML/MIN/1.73
GLOBULIN UR ELPH-MCNC: 2.4 GM/DL
GLUCOSE SERPL-MCNC: 91 MG/DL (ref 65–99)
HAV IGM SERPL QL IA: NORMAL
HBV CORE IGM SERPL QL IA: NORMAL
HBV SURFACE AG SERPL QL IA: NORMAL
HCV AB SER QL: NORMAL
POTASSIUM SERPL-SCNC: 3.7 MMOL/L (ref 3.5–5.2)
PROT SERPL-MCNC: 6.5 G/DL (ref 6–8.5)
SODIUM SERPL-SCNC: 136 MMOL/L (ref 136–145)

## 2024-10-29 RX ORDER — DIPHENHYDRAMINE HYDROCHLORIDE 50 MG/ML
50 INJECTION INTRAMUSCULAR; INTRAVENOUS AS NEEDED
Start: 2024-11-12

## 2024-10-29 RX ORDER — ACETAMINOPHEN 325 MG/1
650 TABLET ORAL ONCE
OUTPATIENT
Start: 2024-11-12

## 2024-10-29 RX ORDER — FAMOTIDINE 20 MG/1
20 TABLET, FILM COATED ORAL AS NEEDED
Start: 2024-11-12

## 2024-10-29 RX ORDER — CETIRIZINE HYDROCHLORIDE 10 MG/1
10 TABLET ORAL ONCE
OUTPATIENT
Start: 2024-11-12

## 2024-10-29 RX ORDER — METHYLPREDNISOLONE SODIUM SUCCINATE 125 MG/2ML
125 INJECTION, POWDER, LYOPHILIZED, FOR SOLUTION INTRAMUSCULAR; INTRAVENOUS AS NEEDED
Start: 2024-11-12

## 2024-10-29 RX ORDER — SODIUM CHLORIDE 9 MG/ML
20 INJECTION, SOLUTION INTRAVENOUS ONCE
OUTPATIENT
Start: 2024-11-12

## 2024-10-29 NOTE — TELEPHONE ENCOUNTER
Therapy plan in to be reviewed and signed, pt on the list to be scheduled at [facility].

## 2024-10-30 ENCOUNTER — PATIENT MESSAGE (OUTPATIENT)
Age: 44
End: 2024-10-30
Payer: MEDICAID

## 2024-10-30 LAB
B2 GLYCOPROT1 IGA SER-ACNC: <9 GPI IGA UNITS (ref 0–25)
B2 GLYCOPROT1 IGG SER-ACNC: <9 GPI IGG UNITS (ref 0–20)
B2 GLYCOPROT1 IGM SER-ACNC: <9 GPI IGM UNITS (ref 0–32)
BACTERIA SPEC AEROBE CULT: NO GROWTH
CARDIOLIPIN IGG SER IA-ACNC: <9 GPL U/ML (ref 0–14)
CARDIOLIPIN IGM SER IA-ACNC: <9 MPL U/ML (ref 0–12)
CCP IGA+IGG SERPL IA-ACNC: 9 UNITS (ref 0–19)
GLIADIN PEPTIDE IGA SER-ACNC: 4 UNITS (ref 0–19)
IGA SERPL-MCNC: 144 MG/DL (ref 87–352)
TTG IGA SER-ACNC: <2 U/ML (ref 0–3)

## 2024-10-31 LAB
C-ANCA TITR SER IF: ABNORMAL TITER
GAMMA INTERFERON BACKGROUND BLD IA-ACNC: 0.04 IU/ML
LA 2 SCREEN W REFLEX-IMP: NORMAL
M TB IFN-G BLD-IMP: NEGATIVE
M TB IFN-G CD4+ BCKGRND COR BLD-ACNC: 0.01 IU/ML
M TB IFN-G CD4+CD8+ BCKGRND COR BLD-ACNC: 0.04 IU/ML
MITOGEN IGNF BCKGRD COR BLD-ACNC: >10 IU/ML
MYELOPEROXIDASE AB SER IA-ACNC: <0.2 UNITS (ref 0–0.9)
P-ANCA ATYPICAL TITR SER IF: ABNORMAL TITER
P-ANCA TITR SER IF: ABNORMAL TITER
PROTEINASE3 AB SER IA-ACNC: <0.2 UNITS (ref 0–0.9)
QUANTIFERON INCUBATION: NORMAL
SCREEN APTT: 41 SEC (ref 0–43.5)
SCREEN DRVVT: 36.6 SEC (ref 0–47)
SERVICE CMNT-IMP: NORMAL

## 2024-11-01 DIAGNOSIS — R76.8 POSITIVE P-ANCA TITER: Primary | ICD-10-CM

## 2024-11-01 LAB
ANA SER QL IF: POSITIVE
ANA SPECKLED TITR SER: ABNORMAL {TITER}
CENTROMERE B AB SER-ACNC: <0.2 AI (ref 0–0.9)
CHROMATIN AB SERPL-ACNC: <0.2 AI (ref 0–0.9)
DSDNA AB SER-ACNC: <1 IU/ML (ref 0–9)
ENA JO1 AB SER-ACNC: <0.2 AI (ref 0–0.9)
ENA RNP AB SER-ACNC: 6.2 AI (ref 0–0.9)
ENA SCL70 AB SER-ACNC: <0.2 AI (ref 0–0.9)
ENA SM AB SER-ACNC: <0.2 AI (ref 0–0.9)
ENA SS-A AB SER-ACNC: <0.2 AI (ref 0–0.9)
ENA SS-B AB SER-ACNC: 0.2 AI (ref 0–0.9)
LABORATORY COMMENT REPORT: ABNORMAL
Lab: ABNORMAL
Lab: ABNORMAL

## 2024-11-04 LAB — HLA-B27 QL NAA+PROBE: NEGATIVE

## 2024-11-08 ENCOUNTER — TELEPHONE (OUTPATIENT)
Age: 44
End: 2024-11-08
Payer: MEDICAID

## 2024-11-08 NOTE — TELEPHONE ENCOUNTER
Patient is scheduled for 11/15/24 at 8:30am. I called and gave appointment information and location to patient. Patient wanted to make sure this would not interfere with GI. Patient is seeing GI on Tuesday. I told her to ask GI if the Benlysta would interfere or if it was ok and to call us back if there was a issue. Patient verbalized understanding.

## 2024-11-12 ENCOUNTER — LAB (OUTPATIENT)
Dept: LAB | Facility: HOSPITAL | Age: 44
End: 2024-11-12
Payer: MEDICAID

## 2024-11-12 ENCOUNTER — OFFICE VISIT (OUTPATIENT)
Dept: GASTROENTEROLOGY | Facility: CLINIC | Age: 44
End: 2024-11-12
Payer: MEDICAID

## 2024-11-12 VITALS
HEART RATE: 81 BPM | SYSTOLIC BLOOD PRESSURE: 122 MMHG | TEMPERATURE: 98 F | HEIGHT: 66 IN | OXYGEN SATURATION: 98 % | WEIGHT: 162 LBS | DIASTOLIC BLOOD PRESSURE: 82 MMHG | BODY MASS INDEX: 26.03 KG/M2

## 2024-11-12 DIAGNOSIS — D50.9 IRON DEFICIENCY ANEMIA, UNSPECIFIED IRON DEFICIENCY ANEMIA TYPE: ICD-10-CM

## 2024-11-12 DIAGNOSIS — K92.1 BLOOD IN STOOL: ICD-10-CM

## 2024-11-12 DIAGNOSIS — R19.7 DIARRHEA, UNSPECIFIED TYPE: Primary | ICD-10-CM

## 2024-11-12 DIAGNOSIS — M32.9 SYSTEMIC LUPUS ERYTHEMATOSUS, UNSPECIFIED SLE TYPE, UNSPECIFIED ORGAN INVOLVEMENT STATUS: ICD-10-CM

## 2024-11-12 DIAGNOSIS — R11.0 NAUSEA: ICD-10-CM

## 2024-11-12 DIAGNOSIS — R19.7 DIARRHEA, UNSPECIFIED TYPE: ICD-10-CM

## 2024-11-12 PROCEDURE — 36415 COLL VENOUS BLD VENIPUNCTURE: CPT

## 2024-11-12 PROCEDURE — 86258 DGP ANTIBODY EACH IG CLASS: CPT

## 2024-11-12 PROCEDURE — 84165 PROTEIN E-PHORESIS SERUM: CPT

## 2024-11-12 PROCEDURE — 99204 OFFICE O/P NEW MOD 45 MIN: CPT | Performed by: INTERNAL MEDICINE

## 2024-11-12 PROCEDURE — 86015 ACTIN ANTIBODY EACH: CPT

## 2024-11-12 PROCEDURE — 86364 TISS TRNSGLTMNASE EA IG CLAS: CPT

## 2024-11-12 PROCEDURE — 86231 EMA EACH IG CLASS: CPT

## 2024-11-12 PROCEDURE — 86381 MITOCHONDRIAL ANTIBODY EACH: CPT

## 2024-11-12 PROCEDURE — 82784 ASSAY IGA/IGD/IGG/IGM EACH: CPT

## 2024-11-12 PROCEDURE — 84155 ASSAY OF PROTEIN SERUM: CPT

## 2024-11-12 RX ORDER — ONDANSETRON 4 MG/1
4 TABLET, FILM COATED ORAL EVERY 8 HOURS PRN
Qty: 30 TABLET | Refills: 1 | Status: SHIPPED | OUTPATIENT
Start: 2024-11-12

## 2024-11-12 NOTE — PROGRESS NOTES
"     New Patient Consultation     Patient Name: Юлия Bautista  : 1980   MRN: [id number]     Chief Complaint   Patient presents with    Abnormal Lab       History of Present Illness: Юлия Bautista is a 44 y.o. female, PMH includes 7 years of diagnosis of lupus, fibromyalgia (started with swollen knee) who is here today for a Gastroenterology Consultation for abnormal tests.  Patient reports his rheumatologist was concerned about a lab that was abnormal so her rheumatologist was concerned about GI disease    Patient had P-ANCA was positive and he was worrried about PSC, UC, crohns, and autoimmune hepatitis    Patient reports certain foods will make her have more urgency.  Patient denies daily diarrhea but is on iron pills which slow down her system.  If she does not take iron.  She will get one day with 4 to 5 loose stools in a day.  No blood in her stool    Has had bright red blood on toilet paper anytime with the bad diarrhea    She has had some cramps \"with her explosions\" This will be for one day and normal.      Patient has had these symptoms for a couple years.  Patient reports donuts from [employer]    Patient is a  has three children (17, 14, 9)    Patient denies personal or FHx of PUD, H Pylori, gastritis, pancreatitis, colitis, Celiac disease, UC, Crohn's disease, IBS, colon or gastric cancers. Pt denies tobacco, illicit substance . Occasional alcohol    Patient takes a nsaids.  Patient takes 4 every 8 hours.  Typically she takes 600 to 800 mg ibuprofen daily for all over pain    Lots of nausea    Last EGD: none  Last Colon: none    Subjective      Review of Systems    Past Medical History:   Diagnosis Date    Anemia     GERD (gastroesophageal reflux disease)     Hypertension     Lupus (systemic lupus erythematosus)     Migraine headache        Past Surgical History:   Procedure Laterality Date    LAPAROSCOPIC TUBAL LIGATION      TUBAL ABDOMINAL LIGATION      WISDOM TOOTH EXTRACTION   " "      Family History   Problem Relation Age of Onset    Depression Mother     Arthritis Mother         osteoarthritis    COPD Mother     Emphysema Mother     Anxiety disorder Mother     Fibromyalgia Mother     Arthritis Father         osteoarthritis    Emphysema Father     COPD Father     Depression Daughter     Anxiety disorder Daughter     Other (POTS) Son     Depression Son     Anxiety disorder Son     Autism Son        Social History     Socioeconomic History    Marital status:    Tobacco Use    Smoking status: Never    Smokeless tobacco: Never   Vaping Use    Vaping status: Never Used   Substance and Sexual Activity    Alcohol use: Yes     Comment: rarely, never heavy    Drug use: Yes     Comment: thc products for lupus    Sexual activity: Defer       Social History     Substance and Sexual Activity   Alcohol Use Yes    Comment: rarely, never heavy     Social History     Tobacco Use   Smoking Status Never   Smokeless Tobacco Never         Current Outpatient Medications:     clonazePAM (KlonoPIN) 0.5 MG tablet, Take 1 tablet by mouth As Needed., Disp: , Rfl:     Cymbalta 30 MG capsule, Take 1 capsule by mouth Daily., Disp: , Rfl:     Cymbalta 60 MG capsule, Take 1 capsule by mouth Daily., Disp: , Rfl:     hydroxychloroquine (PLAQUENIL) 200 MG tablet, Take 1 tablet by mouth Daily., Disp: 90 tablet, Rfl: 1    Iron-Vitamin C (Iron 100/C) 100-250 MG tablet, Take 1 tablet by mouth Daily., Disp: , Rfl:     Junel 1.5/30 1.5-30 MG-MCG tablet, Take 1 tablet by mouth Daily., Disp: , Rfl:     pregabalin (Lyrica) 75 MG capsule, Take 1 capsule by mouth 2 (Two) Times a Day., Disp: 60 capsule, Rfl: 5    Allergies   Allergen Reactions    Chloraseptic [Benzocaine-Menthol] Anaphylaxis     Chloraseptic throat spray       Objective     Physical Exam:  Vitals:    11/12/24 1112   BP: 122/82   Pulse: 81   Temp: 98 °F (36.7 °C)   TempSrc: Temporal   SpO2: 98%   Weight: 73.5 kg (162 lb)   Height: 167.6 cm (65.98\")     Body mass " index is 26.16 kg/m².     Physical Exam  Constitutional:       Appearance: Normal appearance.   Abdominal:      General: Abdomen is flat.      Palpations: Abdomen is soft.   Neurological:      General: No focal deficit present.      Mental Status: She is alert and oriented to person, place, and time.   Psychiatric:         Mood and Affect: Mood normal.         Behavior: Behavior normal.         Assessment / Plan      1. Diarrhea, unspecified type    - Celiac Comprehensive Panel; Future  - US Liver; Future  - Ambulatory Referral For Screening Colonoscopy    2. Blood in stool    - Celiac Comprehensive Panel; Future  - US Liver; Future  - Ambulatory Referral For Screening Colonoscopy    3. Systemic lupus erythematosus, unspecified SLE type, unspecified organ involvement status    - Anti-Smooth Muscle Antibody Titer; Future  - Mitochondrial Antibodies, M2; Future  - Protein Electrophoresis, Total; Future  - US Liver; Future    4. Iron deficiency anemia, unspecified iron deficiency anemia type    - Ambulatory Referral For Screening Colonoscopy  - Ambulatory referral for Screening EGD    5. Nausea  - EGD      Health Maintenance  Will go ahead and do colonoscopy    Follow Up:   Office follow up after endoscopy    Plan of care reviewed with the patient at the conclusion of today's visit.  Education was provided regarding diagnosis, management, and any prescribed or recommended OTC medications.  Patient verbalized understanding of and agreement with management plan.     NOTE TO PATIENT: The 21st Century Cures Act makes medical notes like these available to patients in the interest of transparency. However, be advised this is a medical document. It is intended as peer to peer communication. It is written in medical language and may contain abbreviations or verbiage that are unfamiliar. It may appear blunt or direct. Medical documents are intended to carry relevant information, facts as evident, and the clinical opinion of the  practitioner.     Marie Tena MD   [facility]    Part of this note may be an electronic transcription/translation of spoken language to printed text using the Dragon Dictation System.

## 2024-11-13 LAB
ALBUMIN SERPL ELPH-MCNC: 3.6 G/DL (ref 2.9–4.4)
ALBUMIN/GLOB SERPL: 1.2 {RATIO} (ref 0.7–1.7)
ALPHA1 GLOB SERPL ELPH-MCNC: 0.3 G/DL (ref 0–0.4)
ALPHA2 GLOB SERPL ELPH-MCNC: 0.7 G/DL (ref 0.4–1)
B-GLOBULIN SERPL ELPH-MCNC: 1 G/DL (ref 0.7–1.3)
ENDOMYSIUM IGA SER QL: NEGATIVE
GAMMA GLOB SERPL ELPH-MCNC: 0.9 G/DL (ref 0.4–1.8)
GLIADIN PEPTIDE IGA SER-ACNC: 3 UNITS (ref 0–19)
GLIADIN PEPTIDE IGG SER-ACNC: 2 UNITS (ref 0–19)
GLOBULIN SER CALC-MCNC: 3 G/DL (ref 2.2–3.9)
IGA SERPL-MCNC: 139 MG/DL (ref 87–352)
LABORATORY COMMENT REPORT: NORMAL
M PROTEIN SERPL ELPH-MCNC: NORMAL G/DL
MITOCHONDRIA M2 IGG SER-ACNC: <20 UNITS (ref 0–20)
PROT SERPL-MCNC: 6.6 G/DL (ref 6–8.5)
SMA IGG SER-ACNC: 12 UNITS (ref 0–19)
TTG IGA SER-ACNC: <2 U/ML (ref 0–3)
TTG IGG SER-ACNC: 4 U/ML (ref 0–5)

## 2024-11-15 ENCOUNTER — INFUSION (OUTPATIENT)
Dept: ONCOLOGY | Facility: HOSPITAL | Age: 44
End: 2024-11-15
Payer: MEDICAID

## 2024-11-15 VITALS
BODY MASS INDEX: 26.71 KG/M2 | HEART RATE: 78 BPM | DIASTOLIC BLOOD PRESSURE: 75 MMHG | RESPIRATION RATE: 18 BRPM | WEIGHT: 165.4 LBS | TEMPERATURE: 97.1 F | SYSTOLIC BLOOD PRESSURE: 115 MMHG

## 2024-11-15 DIAGNOSIS — M32.9 SYSTEMIC LUPUS ERYTHEMATOSUS, UNSPECIFIED SLE TYPE, UNSPECIFIED ORGAN INVOLVEMENT STATUS: Primary | ICD-10-CM

## 2024-11-15 PROCEDURE — 96365 THER/PROPH/DIAG IV INF INIT: CPT

## 2024-11-15 PROCEDURE — 25010000002 BELIMUMAB 120 MG RECONSTITUTED SOLUTION 120 MG VIAL: Performed by: INTERNAL MEDICINE

## 2024-11-15 PROCEDURE — 25810000003 SODIUM CHLORIDE 0.9 % SOLUTION 250 ML FLEX CONT: Performed by: INTERNAL MEDICINE

## 2024-11-15 PROCEDURE — 25010000002 BELIMUMAB 400 MG RECONSTITUTED SOLUTION 400 MG VIAL: Performed by: INTERNAL MEDICINE

## 2024-11-15 RX ORDER — METHYLPREDNISOLONE SODIUM SUCCINATE 125 MG/2ML
125 INJECTION, POWDER, LYOPHILIZED, FOR SOLUTION INTRAMUSCULAR; INTRAVENOUS AS NEEDED
Start: 2024-11-29

## 2024-11-15 RX ORDER — FAMOTIDINE 20 MG/1
20 TABLET, FILM COATED ORAL AS NEEDED
Start: 2024-11-29

## 2024-11-15 RX ORDER — SODIUM CHLORIDE 9 MG/ML
20 INJECTION, SOLUTION INTRAVENOUS ONCE
OUTPATIENT
Start: 2024-11-29

## 2024-11-15 RX ORDER — ACETAMINOPHEN 325 MG/1
650 TABLET ORAL ONCE
OUTPATIENT
Start: 2024-11-29

## 2024-11-15 RX ORDER — CETIRIZINE HYDROCHLORIDE 10 MG/1
10 TABLET ORAL ONCE
Status: COMPLETED | OUTPATIENT
Start: 2024-11-15 | End: 2024-11-15

## 2024-11-15 RX ORDER — ACETAMINOPHEN 325 MG/1
650 TABLET ORAL ONCE
Status: COMPLETED | OUTPATIENT
Start: 2024-11-15 | End: 2024-11-15

## 2024-11-15 RX ORDER — CETIRIZINE HYDROCHLORIDE 10 MG/1
10 TABLET ORAL ONCE
OUTPATIENT
Start: 2024-11-29

## 2024-11-15 RX ORDER — DIPHENHYDRAMINE HYDROCHLORIDE 50 MG/ML
50 INJECTION INTRAMUSCULAR; INTRAVENOUS AS NEEDED
Start: 2024-11-29

## 2024-11-15 RX ADMIN — CETIRIZINE HYDROCHLORIDE 10 MG: 10 TABLET, FILM COATED ORAL at 08:38

## 2024-11-15 RX ADMIN — BELIMUMAB 760 MG: 400 INJECTION, POWDER, LYOPHILIZED, FOR SOLUTION INTRAVENOUS at 08:45

## 2024-11-15 RX ADMIN — ACETAMINOPHEN 650 MG: 325 TABLET ORAL at 08:38

## 2024-12-03 ENCOUNTER — PRIOR AUTHORIZATION (OUTPATIENT)
Dept: GASTROENTEROLOGY | Facility: CLINIC | Age: 44
End: 2024-12-03
Payer: MEDICAID

## 2024-12-03 RX ORDER — SODIUM, POTASSIUM,MAG SULFATES 17.5-3.13G
1 SOLUTION, RECONSTITUTED, ORAL ORAL TAKE AS DIRECTED
Qty: 354 ML | Refills: 0 | Status: SHIPPED | OUTPATIENT
Start: 2024-12-03

## 2024-12-03 NOTE — TELEPHONE ENCOUNTER
The request has been approved. The authorization is effective from 12/03/2024 to 12/03/2025, as long as the member is enrolled in their current health plan. A written notification letter will follow with additional details.

## 2024-12-05 ENCOUNTER — INFUSION (OUTPATIENT)
Dept: ONCOLOGY | Facility: HOSPITAL | Age: 44
End: 2024-12-05
Payer: MEDICAID

## 2024-12-05 VITALS
WEIGHT: 161.2 LBS | DIASTOLIC BLOOD PRESSURE: 82 MMHG | RESPIRATION RATE: 18 BRPM | SYSTOLIC BLOOD PRESSURE: 122 MMHG | TEMPERATURE: 97.5 F | BODY MASS INDEX: 26.03 KG/M2 | HEART RATE: 80 BPM

## 2024-12-05 DIAGNOSIS — M32.9 SYSTEMIC LUPUS ERYTHEMATOSUS, UNSPECIFIED SLE TYPE, UNSPECIFIED ORGAN INVOLVEMENT STATUS: Primary | ICD-10-CM

## 2024-12-05 PROCEDURE — G0463 HOSPITAL OUTPT CLINIC VISIT: HCPCS

## 2024-12-05 RX ORDER — DIPHENHYDRAMINE HYDROCHLORIDE 50 MG/ML
50 INJECTION INTRAMUSCULAR; INTRAVENOUS AS NEEDED
Status: CANCELLED
Start: 2024-12-12

## 2024-12-05 RX ORDER — ACETAMINOPHEN 325 MG/1
650 TABLET ORAL ONCE
Status: DISCONTINUED | OUTPATIENT
Start: 2024-12-05 | End: 2024-12-05 | Stop reason: HOSPADM

## 2024-12-05 RX ORDER — METHYLPREDNISOLONE SODIUM SUCCINATE 125 MG/2ML
125 INJECTION INTRAMUSCULAR; INTRAVENOUS AS NEEDED
Status: CANCELLED
Start: 2024-12-12

## 2024-12-05 RX ORDER — SODIUM CHLORIDE 9 MG/ML
20 INJECTION, SOLUTION INTRAVENOUS ONCE
Status: CANCELLED | OUTPATIENT
Start: 2024-12-12

## 2024-12-05 RX ORDER — CETIRIZINE HYDROCHLORIDE 10 MG/1
10 TABLET ORAL ONCE
Status: CANCELLED | OUTPATIENT
Start: 2024-12-12

## 2024-12-05 RX ORDER — FAMOTIDINE 20 MG/1
20 TABLET, FILM COATED ORAL AS NEEDED
Status: CANCELLED
Start: 2024-12-12

## 2024-12-05 RX ORDER — CETIRIZINE HYDROCHLORIDE 10 MG/1
10 TABLET ORAL ONCE
Status: DISCONTINUED | OUTPATIENT
Start: 2024-12-05 | End: 2024-12-05 | Stop reason: HOSPADM

## 2024-12-05 RX ORDER — ACETAMINOPHEN 325 MG/1
650 TABLET ORAL ONCE
Status: CANCELLED | OUTPATIENT
Start: 2024-12-12

## 2024-12-05 NOTE — PROGRESS NOTES
14:46  Unable to obtain IV access after several attempts.  Patient will be transferred to [facility] for infusion 12/6.

## 2024-12-06 ENCOUNTER — HOSPITAL ENCOUNTER (OUTPATIENT)
Dept: ONCOLOGY | Facility: HOSPITAL | Age: 44
Discharge: HOME | End: 2024-12-06
Payer: MEDICAID

## 2024-12-06 VITALS
SYSTOLIC BLOOD PRESSURE: 124 MMHG | TEMPERATURE: 97.9 F | HEIGHT: 66 IN | DIASTOLIC BLOOD PRESSURE: 72 MMHG | RESPIRATION RATE: 16 BRPM | WEIGHT: 159 LBS | HEART RATE: 69 BPM | BODY MASS INDEX: 25.55 KG/M2

## 2024-12-06 DIAGNOSIS — M32.9 SYSTEMIC LUPUS ERYTHEMATOSUS, UNSPECIFIED SLE TYPE, UNSPECIFIED ORGAN INVOLVEMENT STATUS: Primary | ICD-10-CM

## 2024-12-06 PROCEDURE — 25010000002 BELIMUMAB 400 MG RECONSTITUTED SOLUTION 400 MG VIAL: Performed by: INTERNAL MEDICINE

## 2024-12-06 PROCEDURE — 96365 THER/PROPH/DIAG IV INF INIT: CPT

## 2024-12-06 PROCEDURE — 25810000003 SODIUM CHLORIDE 0.9 % SOLUTION: Performed by: INTERNAL MEDICINE

## 2024-12-06 PROCEDURE — 25810000003 SODIUM CHLORIDE 0.9 % SOLUTION 250 ML FLEX CONT: Performed by: INTERNAL MEDICINE

## 2024-12-06 RX ORDER — METHYLPREDNISOLONE SODIUM SUCCINATE 125 MG/2ML
125 INJECTION INTRAMUSCULAR; INTRAVENOUS AS NEEDED
Start: 2024-12-13

## 2024-12-06 RX ORDER — CETIRIZINE HYDROCHLORIDE 10 MG/1
10 TABLET ORAL ONCE
Status: CANCELLED | OUTPATIENT
Start: 2024-12-13

## 2024-12-06 RX ORDER — ACETAMINOPHEN 325 MG/1
650 TABLET ORAL ONCE
Status: CANCELLED | OUTPATIENT
Start: 2024-12-13

## 2024-12-06 RX ORDER — SODIUM CHLORIDE 9 MG/ML
20 INJECTION, SOLUTION INTRAVENOUS ONCE
OUTPATIENT
Start: 2024-12-13

## 2024-12-06 RX ORDER — SODIUM CHLORIDE 9 MG/ML
20 INJECTION, SOLUTION INTRAVENOUS ONCE
Status: COMPLETED | OUTPATIENT
Start: 2024-12-06 | End: 2024-12-06

## 2024-12-06 RX ORDER — CETIRIZINE HYDROCHLORIDE 10 MG/1
10 TABLET ORAL ONCE
Status: COMPLETED | OUTPATIENT
Start: 2024-12-06 | End: 2024-12-06

## 2024-12-06 RX ORDER — ACETAMINOPHEN 325 MG/1
650 TABLET ORAL ONCE
Status: COMPLETED | OUTPATIENT
Start: 2024-12-06 | End: 2024-12-06

## 2024-12-06 RX ORDER — FAMOTIDINE 20 MG/1
20 TABLET, FILM COATED ORAL AS NEEDED
Start: 2024-12-13

## 2024-12-06 RX ORDER — DIPHENHYDRAMINE HYDROCHLORIDE 50 MG/ML
50 INJECTION INTRAMUSCULAR; INTRAVENOUS AS NEEDED
Start: 2024-12-13

## 2024-12-06 RX ADMIN — SODIUM CHLORIDE 760 MG: 9 INJECTION, SOLUTION INTRAVENOUS at 15:30

## 2024-12-06 RX ADMIN — CETIRIZINE HYDROCHLORIDE 10 MG: 10 TABLET ORAL at 15:22

## 2024-12-06 RX ADMIN — ACETAMINOPHEN 650 MG: 325 TABLET ORAL at 15:22

## 2024-12-06 RX ADMIN — SODIUM CHLORIDE 20 ML/HR: 9 INJECTION, SOLUTION INTRAVENOUS at 15:15

## 2024-12-11 ENCOUNTER — OUTSIDE FACILITY SERVICE (OUTPATIENT)
Dept: GASTROENTEROLOGY | Facility: CLINIC | Age: 44
End: 2024-12-11
Payer: MEDICAID

## 2024-12-11 PROCEDURE — 45380 COLONOSCOPY AND BIOPSY: CPT | Performed by: INTERNAL MEDICINE

## 2024-12-11 PROCEDURE — 43239 EGD BIOPSY SINGLE/MULTIPLE: CPT | Performed by: INTERNAL MEDICINE

## 2024-12-11 PROCEDURE — 88305 TISSUE EXAM BY PATHOLOGIST: CPT | Performed by: INTERNAL MEDICINE

## 2024-12-12 ENCOUNTER — LAB REQUISITION (OUTPATIENT)
Dept: LAB | Facility: HOSPITAL | Age: 44
End: 2024-12-12
Payer: MEDICAID

## 2024-12-12 DIAGNOSIS — K92.1 MELENA: ICD-10-CM

## 2024-12-12 DIAGNOSIS — K64.8 OTHER HEMORRHOIDS: ICD-10-CM

## 2024-12-12 DIAGNOSIS — D50.9 IRON DEFICIENCY ANEMIA, UNSPECIFIED: ICD-10-CM

## 2024-12-12 DIAGNOSIS — K62.5 HEMORRHAGE OF ANUS AND RECTUM: ICD-10-CM

## 2024-12-13 ENCOUNTER — HOSPITAL ENCOUNTER (OUTPATIENT)
Dept: ULTRASOUND IMAGING | Facility: HOSPITAL | Age: 44
Discharge: HOME | End: 2024-12-13
Payer: MEDICAID

## 2024-12-13 DIAGNOSIS — R19.7 DIARRHEA, UNSPECIFIED TYPE: ICD-10-CM

## 2024-12-13 DIAGNOSIS — K92.1 BLOOD IN STOOL: ICD-10-CM

## 2024-12-13 DIAGNOSIS — M32.9 SYSTEMIC LUPUS ERYTHEMATOSUS, UNSPECIFIED SLE TYPE, UNSPECIFIED ORGAN INVOLVEMENT STATUS: ICD-10-CM

## 2024-12-13 PROCEDURE — 76705 ECHO EXAM OF ABDOMEN: CPT

## 2024-12-19 DIAGNOSIS — M32.9 SYSTEMIC LUPUS ERYTHEMATOSUS, UNSPECIFIED SLE TYPE, UNSPECIFIED ORGAN INVOLVEMENT STATUS: Primary | ICD-10-CM

## 2024-12-19 LAB — REF LAB TEST METHOD: NORMAL

## 2024-12-20 ENCOUNTER — INFUSION (OUTPATIENT)
Dept: ONCOLOGY | Facility: HOSPITAL | Age: 44
End: 2024-12-20
Payer: MEDICAID

## 2024-12-20 VITALS
TEMPERATURE: 98.4 F | SYSTOLIC BLOOD PRESSURE: 125 MMHG | BODY MASS INDEX: 25.9 KG/M2 | HEART RATE: 73 BPM | WEIGHT: 160.4 LBS | RESPIRATION RATE: 18 BRPM | DIASTOLIC BLOOD PRESSURE: 83 MMHG

## 2024-12-20 DIAGNOSIS — M32.9 SYSTEMIC LUPUS ERYTHEMATOSUS, UNSPECIFIED SLE TYPE, UNSPECIFIED ORGAN INVOLVEMENT STATUS: Primary | ICD-10-CM

## 2024-12-20 PROCEDURE — 25810000003 SODIUM CHLORIDE 0.9 % SOLUTION 250 ML FLEX CONT: Performed by: INTERNAL MEDICINE

## 2024-12-20 PROCEDURE — 96365 THER/PROPH/DIAG IV INF INIT: CPT

## 2024-12-20 PROCEDURE — 25010000002 BELIMUMAB 120 MG RECONSTITUTED SOLUTION 120 MG VIAL: Performed by: INTERNAL MEDICINE

## 2024-12-20 PROCEDURE — 25010000002 BELIMUMAB 400 MG RECONSTITUTED SOLUTION 400 MG VIAL: Performed by: INTERNAL MEDICINE

## 2024-12-20 RX ORDER — CETIRIZINE HYDROCHLORIDE 10 MG/1
10 TABLET ORAL ONCE
Status: CANCELLED | OUTPATIENT
Start: 2024-12-20

## 2024-12-20 RX ORDER — CETIRIZINE HYDROCHLORIDE 10 MG/1
10 TABLET ORAL ONCE
Status: COMPLETED | OUTPATIENT
Start: 2024-12-20 | End: 2024-12-20

## 2024-12-20 RX ORDER — FAMOTIDINE 20 MG/1
20 TABLET, FILM COATED ORAL AS NEEDED
Status: CANCELLED
Start: 2024-12-20

## 2024-12-20 RX ORDER — FAMOTIDINE 20 MG/1
20 TABLET, FILM COATED ORAL AS NEEDED
Start: 2025-01-17

## 2024-12-20 RX ORDER — SODIUM CHLORIDE 9 MG/ML
20 INJECTION, SOLUTION INTRAVENOUS ONCE
OUTPATIENT
Start: 2025-01-17

## 2024-12-20 RX ORDER — METHYLPREDNISOLONE SODIUM SUCCINATE 125 MG/2ML
125 INJECTION INTRAMUSCULAR; INTRAVENOUS AS NEEDED
Start: 2025-01-17

## 2024-12-20 RX ORDER — SODIUM CHLORIDE 9 MG/ML
20 INJECTION, SOLUTION INTRAVENOUS ONCE
Status: CANCELLED | OUTPATIENT
Start: 2024-12-20

## 2024-12-20 RX ORDER — DIPHENHYDRAMINE HYDROCHLORIDE 50 MG/ML
50 INJECTION INTRAMUSCULAR; INTRAVENOUS AS NEEDED
Status: CANCELLED
Start: 2024-12-20

## 2024-12-20 RX ORDER — METHYLPREDNISOLONE SODIUM SUCCINATE 125 MG/2ML
125 INJECTION INTRAMUSCULAR; INTRAVENOUS AS NEEDED
Status: CANCELLED
Start: 2024-12-20

## 2024-12-20 RX ORDER — DIPHENHYDRAMINE HYDROCHLORIDE 50 MG/ML
50 INJECTION INTRAMUSCULAR; INTRAVENOUS AS NEEDED
Start: 2025-01-17

## 2024-12-20 RX ORDER — SODIUM CHLORIDE 9 MG/ML
20 INJECTION, SOLUTION INTRAVENOUS ONCE
Status: DISCONTINUED | OUTPATIENT
Start: 2024-12-20 | End: 2024-12-20 | Stop reason: HOSPADM

## 2024-12-20 RX ORDER — ACETAMINOPHEN 325 MG/1
650 TABLET ORAL ONCE
Status: CANCELLED | OUTPATIENT
Start: 2024-12-20

## 2024-12-20 RX ORDER — ACETAMINOPHEN 325 MG/1
650 TABLET ORAL ONCE
Status: COMPLETED | OUTPATIENT
Start: 2024-12-20 | End: 2024-12-20

## 2024-12-20 RX ORDER — ACETAMINOPHEN 325 MG/1
650 TABLET ORAL ONCE
OUTPATIENT
Start: 2025-01-17

## 2024-12-20 RX ORDER — CETIRIZINE HYDROCHLORIDE 10 MG/1
10 TABLET ORAL ONCE
OUTPATIENT
Start: 2025-01-17

## 2024-12-20 RX ADMIN — ACETAMINOPHEN 650 MG: 325 TABLET ORAL at 09:26

## 2024-12-20 RX ADMIN — CETIRIZINE HYDROCHLORIDE 10 MG: 10 TABLET, FILM COATED ORAL at 09:26

## 2024-12-20 RX ADMIN — BELIMUMAB 760 MG: 400 INJECTION, POWDER, LYOPHILIZED, FOR SOLUTION INTRAVENOUS at 09:39

## 2025-01-07 DIAGNOSIS — D50.9 IRON DEFICIENCY ANEMIA, UNSPECIFIED IRON DEFICIENCY ANEMIA TYPE: Primary | ICD-10-CM

## 2025-01-08 ENCOUNTER — TELEPHONE (OUTPATIENT)
Dept: GASTROENTEROLOGY | Facility: CLINIC | Age: 45
End: 2025-01-08
Payer: MEDICAID

## 2025-01-08 NOTE — TELEPHONE ENCOUNTER
Mallika pt, has pill cam 2/3, needs bowel prep called in please, Sutab (pills, per patient request), to:    [id number] - [place], [place] [id number] [facility] [address] [facility] [place] [address] - [phone] [facility] [phone] FX

## 2025-01-10 ENCOUNTER — PRIOR AUTHORIZATION (OUTPATIENT)
Dept: GASTROENTEROLOGY | Facility: CLINIC | Age: 45
End: 2025-01-10
Payer: MEDICAID

## 2025-01-10 NOTE — TELEPHONE ENCOUNTER
The request has been approved. The authorization is effective from 01/10/2025 to 01/10/2026, as long as the member is enrolled in their current health plan. A written notification letter will follow with additional details.